# Patient Record
Sex: MALE | Race: WHITE | Employment: UNEMPLOYED | URBAN - METROPOLITAN AREA
[De-identification: names, ages, dates, MRNs, and addresses within clinical notes are randomized per-mention and may not be internally consistent; named-entity substitution may affect disease eponyms.]

---

## 2021-12-10 ENCOUNTER — TELEPHONE (OUTPATIENT)
Dept: SLEEP CENTER | Age: 66
End: 2021-12-10

## 2021-12-13 ENCOUNTER — TELEPHONE (OUTPATIENT)
Dept: SLEEP CENTER | Age: 66
End: 2021-12-13

## 2021-12-29 ENCOUNTER — TELEPHONE (OUTPATIENT)
Dept: SLEEP CENTER | Age: 66
End: 2021-12-29

## 2022-02-05 ENCOUNTER — TELEPHONE (OUTPATIENT)
Dept: SLEEP CENTER | Age: 67
End: 2022-02-05

## 2022-02-07 ENCOUNTER — HOSPITAL ENCOUNTER (OUTPATIENT)
Dept: SLEEP CENTER | Age: 67
Discharge: HOME | End: 2022-02-07
Payer: COMMERCIAL

## 2022-02-07 DIAGNOSIS — G47.33 OSA (OBSTRUCTIVE SLEEP APNEA): Primary | ICD-10-CM

## 2022-02-07 PROCEDURE — 95810 POLYSOM 6/> YRS 4/> PARAM: CPT

## 2022-02-21 LAB — STATUS: NORMAL

## 2022-02-21 PROCEDURE — 95810 POLYSOM 6/> YRS 4/> PARAM: CPT | Performed by: STUDENT IN AN ORGANIZED HEALTH CARE EDUCATION/TRAINING PROGRAM

## 2022-02-25 ENCOUNTER — TELEPHONE (OUTPATIENT)
Dept: SLEEP CENTER | Age: 67
End: 2022-02-25

## 2022-02-25 NOTE — TELEPHONE ENCOUNTER
Called patient to schedule CPAP, patient wishes to speak to his physician first before scheduling.   He will call us back

## 2022-08-15 ENCOUNTER — HOSPITAL ENCOUNTER (OUTPATIENT)
Dept: SLEEP CENTER | Age: 67
Discharge: HOME | End: 2022-08-15
Payer: COMMERCIAL

## 2022-08-15 DIAGNOSIS — G47.33 OSA (OBSTRUCTIVE SLEEP APNEA): Primary | ICD-10-CM

## 2022-08-15 PROCEDURE — 95811 POLYSOM 6/>YRS CPAP 4/> PARM: CPT

## 2022-08-19 PROCEDURE — 95811 POLYSOM 6/>YRS CPAP 4/> PARM: CPT | Performed by: INTERNAL MEDICINE

## 2022-08-19 NOTE — PROCEDURES
[address] [place] [address]       170 [facility], [address]        POLYSOMNOGRAPHY WITH CPAP TITRATION SLEEP STUDY REPORT       PATIENT NAME: Eduar Watts               : 1955        MED REC NO:  [id number]     ACCOUNT NO:   [de-identified]  PROVIDER:  Oscar Trejo DO  DATE OF STUDY: 8/15/22     SERVICE PROVIDER:  [facility]     REFERRING PROVIDER:   Dr. Esteban Aden   AGE: 77 yrs       SEX: M          HEIGHT: 6 ft   0 in         WEIGHT: 190 lbs          BMI: 25.8 kg/m2    NECK CIRCUMFERENCE: 15.75 in    Symptoms: Snoring, trouble falling asleep once awakened. The Kirksey Sleepiness Scale was 4 out of 24 (scores above or equal to 10 are suggestive of hypersomnolence). Indication: Titration of positive airway pressure therapy. Medical History:  Hypertension, hyperlipidemia, obstructive sleep apnea, central sleep apnea. Medications: Valsartan, amlodipine, rivaroxaban, alirocumab. DESCRIPTION: This patient has been previously diagnosed with sleep-disordered breathing and now returns for positive airway pressure titration. This full night of positive airway pressure titration consisted of EEG, EOG, EMG and 2-lead ECG monitoring. Airflow (internal CPAP/bi-level PAP flow signal), chest and abdominal efforts by respiratory inductance plethysmography or polyvinylidene fluoride (PVDF) sensor, and pulse oximetry were monitored as well. Hypopneas were scored as at least a 30% reduction in amplitude of the semi-quantitative flow signal, associated with a 3% or greater oxygen desaturation.  Respiratory effort related arousals (RERAs) were scored as at least a 30% reduction in amplitude of the semi-quantitative flow signal, associated with an EEG microarousal.      FINDINGS:  SLEEP CONTINUITY AND SLEEP ARCHITECTURE:    Testing began at 9:24:48 PM and ended at 5:21:27 AM, for a total recording time (TRT) of 476.7 minutes. The sleep period lasted 254.8 minutes and the total sleep time (TST) was 213.8 minutes, which resulted in a sleep efficiency (TST÷TRT) of 44.8%. The sleep latency (SL) was 221.9 minutes, and the latency to the first occurrence of Stage R was 166.0 minutes. There were 1 Stage R periods observed on this study night, 13 awakenings (i.e. transitions to Stage W from any sleep stage), and 62 total stage transitions. Wake after sleep onset (WASO) time accounted for 41.0 minutes, while the time spent is each sleep stage was 23.0 minutes (Stage N1); 137.5 minutes (Stage N2); 17.3 minutes (Stage N3); and 36.0 minutes (Stage R). The percentage of Total Sleep Time in each stage was: 10.8% (Stage N1); 64.3% (Stage N2); 8.1% (Stage N3); and 16.8% (Stage R). The microarousal index was increased at 27.2. RESPIRATORY MONITORING: CPAP was initiated at 5 cm of water pressure and titrated to 13 cm of water in order to abolish respiratory events. Respiratory events were significantly reduced with the use of positive airway pressure therapy. Titration at the optimal CPAP pressure of  13 cm of water resulted in an AHI of 1.1/hr, a minimum oxyhemoglobin saturation of 92%, and an average oxyhemoglobin saturation of 94.9%. Time spent on this level of CPAP included supine REM sleep. EEG: No abnormal epileptiform activity was observed. ECG: The electrocardiogram documented sinus rhythm. The average heart rate during sleep was 56 beats per minute. PERIODIC LIMB MOVEMENTS:  Few periodic limb movements were noted. EMG/VIDEO MONITORING:  Loss of REM atonia, bruxism, and parasomnias were not observed. IMPRESSION:    1. This study demonstrated significant improvement in sleep disordered breathing with CPAP of 13 cm H2O.  2.  Subjectively, the patient reported sleeping poorly but he is willing to use PAP therapy at home on a chronic basis. RECOMMENDATIONS:  1.   CPAP therapy at settings of 13 cm of water is recommended, to be ordered by referring physician. Equipment ordering information is below. Patient may be referred to [facility] for management of sleep apnea and PAP therapy, at discretion of referring provider. 2.  Per insurance requirements, the patient should be seen in clinical follow up within 3 months of receiving CPAP therapy in order to document adherence to therapy, assess efficacy of the prescribed settings (as based upon a residual AHI of less than or equal to 5 on the device's remote download), and evaluate resolution of sleep-related complaints. 3.  The patient should be strongly counseled against driving while drowsy.       EQUIPMENT ORDERING INFORMATION:  DEVICE: CPAP 13 with heated humidification and a remote modem    SETTINGS:  13 cm of water, EPR 3    MASK TYPE: Resmed F20, or per patient preference fitted per DME    MASK SIZE: Medium    SUPPLEMENTAL OXYGEN: None

## 2022-08-30 ENCOUNTER — TELEPHONE (OUTPATIENT)
Dept: SLEEP CENTER | Age: 67
End: 2022-08-30

## 2023-03-11 ENCOUNTER — APPOINTMENT (OUTPATIENT)
Dept: CT IMAGING | Age: 68
DRG: 069 | End: 2023-03-11
Payer: MEDICARE

## 2023-03-11 ENCOUNTER — APPOINTMENT (OUTPATIENT)
Dept: MRI IMAGING | Age: 68
DRG: 069 | End: 2023-03-11
Payer: MEDICARE

## 2023-03-11 ENCOUNTER — HOSPITAL ENCOUNTER (INPATIENT)
Age: 68
LOS: 1 days | Discharge: HOME | DRG: 069 | End: 2023-03-12
Attending: EMERGENCY MEDICINE | Admitting: FAMILY MEDICINE
Payer: MEDICARE

## 2023-03-11 DIAGNOSIS — R29.90 STROKE-LIKE SYMPTOMS: Primary | ICD-10-CM

## 2023-03-11 LAB
ALBUMIN SERPL-MCNC: 4.4 G/DL (ref 3.5–5.2)
ALP BLD-CCNC: 66 U/L (ref 40–129)
ALT SERPL-CCNC: 25 U/L (ref 0–40)
ANION GAP SERPL CALCULATED.3IONS-SCNC: 13 MMOL/L (ref 7–16)
AST SERPL-CCNC: 28 U/L (ref 0–39)
BASOPHILS ABSOLUTE: 0.07 E9/L (ref 0–0.2)
BASOPHILS RELATIVE PERCENT: 1 % (ref 0–2)
BILIRUB SERPL-MCNC: 0.4 MG/DL (ref 0–1.2)
BUN BLDV-MCNC: 18 MG/DL (ref 6–23)
CALCIUM SERPL-MCNC: 11 MG/DL (ref 8.6–10.2)
CHLORIDE BLD-SCNC: 101 MMOL/L (ref 98–107)
CO2: 26 MMOL/L (ref 22–29)
CREAT SERPL-MCNC: 1 MG/DL (ref 0.7–1.2)
EOSINOPHILS ABSOLUTE: 0.37 E9/L (ref 0.05–0.5)
EOSINOPHILS RELATIVE PERCENT: 5.3 % (ref 0–6)
GFR SERPL CREATININE-BSD FRML MDRD: >60 ML/MIN/1.73
GLUCOSE BLD-MCNC: 120 MG/DL (ref 74–99)
HCT VFR BLD CALC: 42.6 % (ref 37–54)
HEMOGLOBIN: 14.6 G/DL (ref 12.5–16.5)
IMMATURE GRANULOCYTES #: 0.02 E9/L
IMMATURE GRANULOCYTES %: 0.3 % (ref 0–5)
LYMPHOCYTES ABSOLUTE: 1.97 E9/L (ref 1.5–4)
LYMPHOCYTES RELATIVE PERCENT: 28.3 % (ref 20–42)
MCH RBC QN AUTO: 30.9 PG (ref 26–35)
MCHC RBC AUTO-ENTMCNC: 34.3 % (ref 32–34.5)
MCV RBC AUTO: 90.1 FL (ref 80–99.9)
MONOCYTES ABSOLUTE: 0.99 E9/L (ref 0.1–0.95)
MONOCYTES RELATIVE PERCENT: 14.2 % (ref 2–12)
NEUTROPHILS ABSOLUTE: 3.53 E9/L (ref 1.8–7.3)
NEUTROPHILS RELATIVE PERCENT: 50.9 % (ref 43–80)
PDW BLD-RTO: 12.8 FL (ref 11.5–15)
PLATELET # BLD: 248 E9/L (ref 130–450)
PMV BLD AUTO: 9 FL (ref 7–12)
POTASSIUM SERPL-SCNC: 3.8 MMOL/L (ref 3.5–5)
RBC # BLD: 4.73 E12/L (ref 3.8–5.8)
SARS-COV-2, NAAT: NOT DETECTED
SODIUM BLD-SCNC: 140 MMOL/L (ref 132–146)
TOTAL PROTEIN: 7.5 G/DL (ref 6.4–8.3)
TROPONIN, HIGH SENSITIVITY: 8 NG/L (ref 0–11)
WBC # BLD: 7 E9/L (ref 4.5–11.5)

## 2023-03-11 PROCEDURE — G0378 HOSPITAL OBSERVATION PER HR: HCPCS

## 2023-03-11 PROCEDURE — 93005 ELECTROCARDIOGRAM TRACING: CPT | Performed by: EMERGENCY MEDICINE

## 2023-03-11 PROCEDURE — 99222 1ST HOSP IP/OBS MODERATE 55: CPT | Performed by: PSYCHIATRY & NEUROLOGY

## 2023-03-11 PROCEDURE — 70551 MRI BRAIN STEM W/O DYE: CPT

## 2023-03-11 PROCEDURE — 92523 SPEECH SOUND LANG COMPREHEN: CPT | Performed by: SPEECH-LANGUAGE PATHOLOGIST

## 2023-03-11 PROCEDURE — 80053 COMPREHEN METABOLIC PANEL: CPT

## 2023-03-11 PROCEDURE — 85025 COMPLETE CBC W/AUTO DIFF WBC: CPT

## 2023-03-11 PROCEDURE — 6370000000 HC RX 637 (ALT 250 FOR IP): Performed by: FAMILY MEDICINE

## 2023-03-11 PROCEDURE — 2060000000 HC ICU INTERMEDIATE R&B

## 2023-03-11 PROCEDURE — 84484 ASSAY OF TROPONIN QUANT: CPT

## 2023-03-11 PROCEDURE — 70450 CT HEAD/BRAIN W/O DYE: CPT

## 2023-03-11 PROCEDURE — 6370000000 HC RX 637 (ALT 250 FOR IP): Performed by: STUDENT IN AN ORGANIZED HEALTH CARE EDUCATION/TRAINING PROGRAM

## 2023-03-11 PROCEDURE — 87635 SARS-COV-2 COVID-19 AMP PRB: CPT

## 2023-03-11 PROCEDURE — 99285 EMERGENCY DEPT VISIT HI MDM: CPT

## 2023-03-11 PROCEDURE — 92610 EVALUATE SWALLOWING FUNCTION: CPT | Performed by: SPEECH-LANGUAGE PATHOLOGIST

## 2023-03-11 RX ORDER — ASPIRIN 300 MG/1
300 SUPPOSITORY RECTAL DAILY
Status: DISCONTINUED | OUTPATIENT
Start: 2023-03-11 | End: 2023-03-12 | Stop reason: HOSPADM

## 2023-03-11 RX ORDER — POLYETHYLENE GLYCOL 3350 17 G/17G
17 POWDER, FOR SOLUTION ORAL DAILY PRN
Status: DISCONTINUED | OUTPATIENT
Start: 2023-03-11 | End: 2023-03-12 | Stop reason: HOSPADM

## 2023-03-11 RX ORDER — ESCITALOPRAM OXALATE 10 MG/1
10 TABLET ORAL DAILY
COMMUNITY

## 2023-03-11 RX ORDER — HYDROCHLOROTHIAZIDE 25 MG/1
12.5 TABLET ORAL DAILY
Status: DISCONTINUED | OUTPATIENT
Start: 2023-03-11 | End: 2023-03-12 | Stop reason: HOSPADM

## 2023-03-11 RX ORDER — ESCITALOPRAM OXALATE 10 MG/1
10 TABLET ORAL DAILY
Status: DISCONTINUED | OUTPATIENT
Start: 2023-03-11 | End: 2023-03-12 | Stop reason: HOSPADM

## 2023-03-11 RX ORDER — ALIROCUMAB 75 MG/ML
75 INJECTION, SOLUTION SUBCUTANEOUS
COMMUNITY

## 2023-03-11 RX ORDER — ENOXAPARIN SODIUM 100 MG/ML
40 INJECTION SUBCUTANEOUS DAILY
Status: DISCONTINUED | OUTPATIENT
Start: 2023-03-11 | End: 2023-03-11

## 2023-03-11 RX ORDER — AMLODIPINE BESYLATE 10 MG/1
10 TABLET ORAL DAILY
COMMUNITY

## 2023-03-11 RX ORDER — VALSARTAN 320 MG/1
320 TABLET ORAL DAILY
Status: DISCONTINUED | OUTPATIENT
Start: 2023-03-11 | End: 2023-03-12 | Stop reason: HOSPADM

## 2023-03-11 RX ORDER — HYDROCHLOROTHIAZIDE 12.5 MG/1
12.5 TABLET ORAL DAILY
COMMUNITY

## 2023-03-11 RX ORDER — LANOLIN ALCOHOL/MO/W.PET/CERES
3 CREAM (GRAM) TOPICAL ONCE
Status: COMPLETED | OUTPATIENT
Start: 2023-03-11 | End: 2023-03-12

## 2023-03-11 RX ORDER — ONDANSETRON 2 MG/ML
4 INJECTION INTRAMUSCULAR; INTRAVENOUS EVERY 6 HOURS PRN
Status: DISCONTINUED | OUTPATIENT
Start: 2023-03-11 | End: 2023-03-12 | Stop reason: HOSPADM

## 2023-03-11 RX ORDER — ATORVASTATIN CALCIUM 40 MG/1
80 TABLET, FILM COATED ORAL NIGHTLY
Status: DISCONTINUED | OUTPATIENT
Start: 2023-03-11 | End: 2023-03-12

## 2023-03-11 RX ORDER — VALSARTAN 320 MG/1
320 TABLET ORAL DAILY
COMMUNITY

## 2023-03-11 RX ORDER — AMLODIPINE BESYLATE 5 MG/1
5 TABLET ORAL DAILY
Status: DISCONTINUED | OUTPATIENT
Start: 2023-03-11 | End: 2023-03-12 | Stop reason: HOSPADM

## 2023-03-11 RX ORDER — ONDANSETRON 4 MG/1
4 TABLET, ORALLY DISINTEGRATING ORAL EVERY 8 HOURS PRN
Status: DISCONTINUED | OUTPATIENT
Start: 2023-03-11 | End: 2023-03-12 | Stop reason: HOSPADM

## 2023-03-11 RX ORDER — ASPIRIN 81 MG/1
81 TABLET ORAL DAILY
Status: DISCONTINUED | OUTPATIENT
Start: 2023-03-11 | End: 2023-03-12 | Stop reason: HOSPADM

## 2023-03-11 RX ADMIN — HYDROCHLOROTHIAZIDE 12.5 MG: 12.5 TABLET ORAL at 14:06

## 2023-03-11 RX ADMIN — AMLODIPINE BESYLATE 5 MG: 5 TABLET ORAL at 10:06

## 2023-03-11 RX ADMIN — ASPIRIN 81 MG: 81 TABLET, COATED ORAL at 10:07

## 2023-03-11 RX ADMIN — ESCITALOPRAM OXALATE 10 MG: 10 TABLET ORAL at 14:06

## 2023-03-11 NOTE — PROGRESS NOTES
Hospitalist Progress Note      Synopsis: Patient admitted on 3/11/2023. Had concerns including Numbness (Pt to ED stating earlier he was playing golf and his vision went blurry pt also stating he felt like he was having a hard time getting words out on the phone. Pt states he woke up today and had numbness in R-face and R-leg. Hx blood clots in R-leg. +xarelto. Pt arrives A&Ox4, ambulatory, speech clear, no facial droop, equal strength in BUE and BLE, sensation intact. Recent flight to [facility]). Subjective  Seen and examined at bedside today morning. Patient does not exhibit any neurological deficits. Patient is resting comfortably. Clinically improving. Feeling better. Stable overnight. No other overnight issues reported. No CP, SOB, palpitations, blurred vision, HA, lightheadedness, LOC or focal neurological deficits    Exam:  BP (!) 136/97   Pulse 61   Temp 98.3 °F (36.8 °C) (Oral)   Resp 16   Ht 6' (1.829 m)   Wt 190 lb (86.2 kg)   SpO2 99%   BMI 25.77 kg/m²   General appearance: No apparent distress, appears stated age and cooperative. HEENT: Pupils equal, round, and reactive to light. Conjunctivae/corneas clear. Neck: Supple. No jugular venous distention. Trachea midline. Respiratory:  Normal respiratory effort. Clear to auscultation, bilaterally without Rales/Wheezes/Rhonchi. Cardiovascular: Regular rate and rhythm with normal S1/S2 without murmurs, rubs or gallops. Abdomen: Soft, non-tender, non-distended with normal bowel sounds. Musculoskeletal: No clubbing, cyanosis or edema bilaterally. Brisk capillary refill. 2+ lower extremity pulses (dorsalis pedis).    Skin:  No rashes    Neurologic: awake, alert and following commands     Medications:  Reviewed    Infusion Medications   Scheduled Medications    amLODIPine  5 mg Oral Daily    valsartan  320 mg Oral Daily    enoxaparin  40 mg SubCUTAneous Daily    aspirin  81 mg Oral Daily    Or    aspirin  300 mg Rectal Daily atorvastatin  80 mg Oral Nightly     PRN Meds: ondansetron **OR** ondansetron, polyethylene glycol    I/O  No intake or output data in the 24 hours ending 03/11/23 1009    Labs:   Recent Labs     03/11/23 0316   WBC 7.0   HGB 14.6   HCT 42.6          Recent Labs     03/11/23 0316      K 3.8      CO2 26   BUN 18   CREATININE 1.0   CALCIUM 11.0*       Recent Labs     03/11/23 0316   PROT 7.5   ALKPHOS 66   ALT 25   AST 28   BILITOT 0.4       No results for input(s): INR in the last 72 hours. No results for input(s): Rosita Zoilaels in the last 72 hours. Chronic labs:  No results found for: CHOL, TRIG, HDL, LDLCALC, TSH, PSA, INR, LABA1C    Radiology:  Imaging studies reviewed today. ASSESSMENT:  Suspected stroke versus TIA  Hypertension  Hyperlipidemia  History of previous DVT and PE       PLAN:  Patient currently neurologically intact. CT head negative. MRI does not show any acute ischemia, only mild chronic microvascular disease within the periventricular and subcortical white matter. We will continue with neurochecks and place patient in telemetry. Continue with aspirin, atorvastatin, Lovenox. A1c and lipid panel pending. Continue with amlodipine, hydrochlorothiazide and valsartan. Neurology consult placed. Patient had DVT in January of last year, hence he has been taking Xarelto. We will continue. Patient also states that he was on a recent flight from [place] this past Wednesday. Diet: Diet NPO  Code Status: Full Code  PT/OT Eval Status:   None need at the moment   DVT Prophylaxis:   Xarelto  Recommended disposition at discharge:  Home    +++++++++++++++++++++++++++++++++++++++++++++++++  Bridget Macdonald MD   [facility].  +++++++++++++++++++++++++++++++++++++++++++++++++  NOTE: This report was transcribed using voice recognition software.  Every effort was made to ensure accuracy; however, inadvertent computerized transcription errors may be present.

## 2023-03-11 NOTE — FLOWSHEET NOTE
Patient to ED after waking up and experiencing numbness on the right side of his face. Patient explains that this is the third incident within 24 hours. He also had some extremity weakness.

## 2023-03-11 NOTE — ED PROVIDER NOTES
HPI:  3/11/23, Time: 3:11 AM JANEL Payton is a 79 y.o. male presenting to the ED for history of numbness to right side of face as well as right lower extremity  beginning 1 hour ago. The complaint has been persistent, moderate in severity, and worsened by nothing. Patient reporting around 3 PM he had an episode where he had some slurred speech and difficulty expressing self. Patient reporting he was also golfing at the time around 4 PM and was having episode of dizziness he reports no loss of vision or any blurred vision. Patient reporting no chest pain no difficulty breathing he does report some mild cough he reports he is getting over a cold. Patient is on Xarelto for history of DVT as well as PE. Patient reporting no pleuritic pain. He reports no upper or lower extremity weakness he reports no headache. Patient reporting no neck or back pain. Patient reporting no symptoms like this in the past.     ROS:   Pertinent positives and negatives are stated within HPI, all other systems reviewed and are negative.  --------------------------------------------- PAST HISTORY ---------------------------------------------  Past Medical History:  has a past medical history of Hyperlipidemia and Hypertension. Past Surgical History:  has a past surgical history that includes transesophageal echocardiogram (03/20/2014); knee surgery; and Nose surgery. Social History:  reports that he has never smoked. He does not have any smokeless tobacco history on file. He reports that he does not drink alcohol and does not use drugs. Family History: family history is not on file. The patients home medications have been reviewed. Allergies: Patient has no known allergies.     ---------------------------------------------------PHYSICAL EXAM--------------------------------------    Constitutional/General: Alert and oriented x3, well appearing, non toxic in NAD  Head: Normocephalic and atraumatic  Eyes: PERRL, EOMI  Mouth: Oropharynx clear, handling secretions, no trismus  Neck: Supple, full ROM, non tender to palpation in the midline, no stridor, no crepitus, no meningeal signs  Pulmonary: Lungs clear to auscultation bilaterally, no wheezes, rales, or rhonchi. Not in respiratory distress  Cardiovascular:  Regular rate. Regular rhythm. No murmurs, gallops, or rubs. 2+ distal pulses  Chest: no chest wall tenderness  Abdomen: Soft. Non tender. Non distended. +BS. No rebound, guarding, or rigidity. No pulsatile masses appreciated. Musculoskeletal: Moves all extremities x 4. Warm and well perfused, no clubbing, cyanosis, or edema. Capillary refill <3 seconds  Skin: warm and dry. No rashes. Neurologic: GCS 15, CN 2-12 grossly intact, no focal deficits, symmetric strength 5/5 in the upper and lower extremities bilaterally  Psych: Normal Affect  NIH Stroke Scale at time of initial evaluation:  1A: Level of Consciousness 0 - alert; keenly responsive   1B: Ask Month and Age 0 - answers both questions correctly   1C:  Tell Patient To Open and Close Eyes, then Hand  Squeeze 0 - performs both tasks correctly   2: Test Horizontal Extraocular Movements 0 - normal   3: Test Visual Fields 0 - no visual loss   4: Test Facial Palsy 0 - normal symmetric movement   5A: Test Left Arm Motor Drift 0 - no drift, limb holds 90 (or 45) degrees for full 10 seconds   5B: Test Right Arm Motor Drift 0 - no drift, limb holds 90 (or 45) degrees for full 10 seconds   6A: Test Left Leg Motor Drift 0 - no drift; leg holds 30 degree position for full 5 seconds   6B: Test Right Leg Motor Drift 0 - no drift; leg holds 30 degree position for full 5 seconds   7: Test Limb Ataxia   (FNF/Heel-Shin) 0 - absent   8: Test Sensation 0 - normal; no sensory loss   9: Test Language/Aphasia 0 - no aphasia, normal   10: Test Dysarthria 0 - normal   11: Test Extinction/Inattention 0 - no abnormality   Total 0 -------------------------------------------------- RESULTS -------------------------------------------------  I have personally reviewed all laboratory and imaging results for this patient. Results are listed below.      LABS:  Results for orders placed or performed during the hospital encounter of 03/11/23   CBC with Auto Differential   Result Value Ref Range    WBC 7.0 4.5 - 11.5 E9/L    RBC 4.73 3.80 - 5.80 E12/L    Hemoglobin 14.6 12.5 - 16.5 g/dL    Hematocrit 42.6 37.0 - 54.0 %    MCV 90.1 80.0 - 99.9 fL    MCH 30.9 26.0 - 35.0 pg    MCHC 34.3 32.0 - 34.5 %    RDW 12.8 11.5 - 15.0 fL    Platelets 663 680 - 733 E9/L    MPV 9.0 7.0 - 12.0 fL    Neutrophils % 50.9 43.0 - 80.0 %    Immature Granulocytes % 0.3 0.0 - 5.0 %    Lymphocytes % 28.3 20.0 - 42.0 %    Monocytes % 14.2 (H) 2.0 - 12.0 %    Eosinophils % 5.3 0.0 - 6.0 %    Basophils % 1.0 0.0 - 2.0 %    Neutrophils Absolute 3.53 1.80 - 7.30 E9/L    Immature Granulocytes # 0.02 E9/L    Lymphocytes Absolute 1.97 1.50 - 4.00 E9/L    Monocytes Absolute 0.99 (H) 0.10 - 0.95 E9/L    Eosinophils Absolute 0.37 0.05 - 0.50 E9/L    Basophils Absolute 0.07 0.00 - 0.20 E9/L   Comprehensive Metabolic Panel   Result Value Ref Range    Sodium 140 132 - 146 mmol/L    Potassium 3.8 3.5 - 5.0 mmol/L    Chloride 101 98 - 107 mmol/L    CO2 26 22 - 29 mmol/L    Anion Gap 13 7 - 16 mmol/L    Glucose 120 (H) 74 - 99 mg/dL    BUN 18 6 - 23 mg/dL    Creatinine 1.0 0.7 - 1.2 mg/dL    Est, Glom Filt Rate >60 >=60 mL/min/1.73    Calcium 11.0 (H) 8.6 - 10.2 mg/dL    Total Protein 7.5 6.4 - 8.3 g/dL    Albumin 4.4 3.5 - 5.2 g/dL    Total Bilirubin 0.4 0.0 - 1.2 mg/dL    Alkaline Phosphatase 66 40 - 129 U/L    ALT 25 0 - 40 U/L    AST 28 0 - 39 U/L   Troponin   Result Value Ref Range    Troponin, High Sensitivity 8 0 - 11 ng/L   EKG 12 Lead   Result Value Ref Range    Ventricular Rate 67 BPM    Atrial Rate 67 BPM    P-R Interval 198 ms    QRS Duration 90 ms    Q-T Interval 412 ms QTc Calculation (Bazett) 435 ms    P Axis 27 degrees    R Axis -12 degrees    T Axis 8 degrees       RADIOLOGY:  Interpreted by Radiologist.  CT HEAD WO CONTRAST   Final Result   No acute intracranial abnormality. RECOMMENDATIONS:   Careful clinical correlation and follow up recommended. EKG:  This EKG is signed and interpreted by me. Rate: 67  Rhythm: Sinus  Interpretation: no acute changes  Comparison: stable as compared to patient's most recent EKG 11/25/15      ------------------------- NURSING NOTES AND VITALS REVIEWED ---------------------------   The nursing notes within the ED encounter and vital signs as below have been reviewed by myself. BP (!) 145/94   Pulse 64   Temp 98.3 °F (36.8 °C) (Oral)   Resp 16   SpO2 96%   Oxygen Saturation Interpretation: Normal    The patients available past medical records and past encounters were reviewed. ------------------------------ ED COURSE/MEDICAL DECISION MAKING----------------------  Medications - No data to display          Medical Decision Making:      History From:   Patient presenting here because of numbness right side of face as well as extremity. Patient reports it started an hour ago but prior to that in the afternoon he was having some slurred speech and having trouble expressing himself. Patient reporting some dizziness as well he reports no chest pain or difficulty breathing reports no abdominal pain. Patient is on Xarelto for history of DVT as well as PE. Patient reporting no headache he reports no neck or back pain    CC/HPI Summary, DDx, ED Course, Reassessment, Tests Considered, Patient expectation:     With history of hypertension history of DVT as well as PE as well as history of hyperlipidemia presenting here because of right-sided facial numbness as right lower extremity numbness.   Patient reporting no chest pain or difficulty breathing reports no active slurred speech but reports in the afternoon he was having some slurred speech as well as dizziness. He reports no loss of vision he reports no headache. Patient reporting no visual disturbance. Patient systolic blood pressure was 137/103 patient on arrival is awake alert oriented x3 heart lung exam normal abdomen soft nontender. Patient has normal strength in all extremities his gait is steady and normal.  Pulses are intact distally. Patient stroke scale is a 0. Patient differential includes TIA as well as stroke as well as migraine headache as well as electrolyte imbalance  Patient had IV established he was placed on monitor. Vital signs stable. Patient CBC and electrolytes within normal limits troponin within the limits. His white count was 7 potassium was 3.8 troponin was 8. Patient did undergo CT of the head I did not appreciate any intracranial findings as far as any signs of stroke or mass or intracranial hemorrhage. We did place a call to internal medicine. Patient was made aware of findings and plan. Patient will be admitted for further evaluation treatment. Social Determinants affecting Dx or Tx: Patient does not smoke he does drink socially. Chronic Conditions: History of DVT history of obstructive sleep apnea    Records Reviewed: Old records reviewed. Patient was seen in for an outpatient was diagnosed with DVT on Xarelto. Patient was diagnosed on January 20, 2022 patient was seen here in December 2013 for paresthesias. Patient did have a CT of his head in December 2013 which was negative        Re-Evaluations:             Re-evaluation. Patients symptoms show no change  Patient reevaluated unchanged. Patient was made aware of findings and plan. Consultations:               Internal medicine  Critical Care: This patient's ED course included: a personal history and physicial eaxmination    This patient has been closely monitored during their ED course. Counseling:    The emergency provider has spoken with the patient and discussed todays results, in addition to providing specific details for the plan of care and counseling regarding the diagnosis and prognosis. Questions are answered at this time and they are agreeable with the plan.       --------------------------------- IMPRESSION AND DISPOSITION ---------------------------------    IMPRESSION  1. Stroke-like symptoms        DISPOSITION  Disposition: Admit to telemetry  Patient condition is stable        NOTE: This report was transcribed using voice recognition software.  Every effort was made to ensure accuracy; however, inadvertent computerized transcription errors may be present          Luis Newman MD  03/11/23 92 Srikanth Gerardo MD  03/11/23 92 Srikanth Gerardo MD  03/11/23 [id number]

## 2023-03-11 NOTE — H&P
Hospitalist History & Physical      PCP: Cynthia Lei MD    Date of Service: Pt seen/examined on 3/11/2023     Chief Complaint:  had concerns including Numbness (Pt to ED stating earlier he was playing golf and his vision went blurry pt also stating he felt like he was having a hard time getting words out on the phone. Pt states he woke up today and had numbness in R-face and R-leg. Hx blood clots in R-leg. +xarelto. Pt arrives A&Ox4, ambulatory, speech clear, no facial droop, equal strength in BUE and BLE, sensation intact. Recent flight to [place]).    History Of Present Illness:    Mr. Trae Whitfield, a 67 y.o. year old male  who  has a past medical history of Hyperlipidemia and Hypertension.     Patient presented to the emergency department with numbness of the right side of his face as well as right lower extremity.  Began about an hour prior to arrival.  Patient reports around 3:00 this afternoon he had an episode with some slurred speech and difficulty expressing himself.  He was golfing at that time was having episode of dizziness.  Patient denies fever, chills, nausea, vomiting, chest pain, shortness of breath.  Vital signs are within normal limits and stable.  The patient is afebrile.  Laboratory studies were unremarkable.  CT head was unremarkable.  Patient will be admitted to medicine for further neurological evaluation.      Past Medical History:   Diagnosis Date    Hyperlipidemia     Hypertension        Past Surgical History:   Procedure Laterality Date    KNEE SURGERY      NOSE SURGERY      TRANSESOPHAGEAL ECHOCARDIOGRAM  03/20/2014    ROSHAN Gilbert       Prior to Admission medications    Medication Sig Start Date End Date Taking? Authorizing Provider   atorvastatin (LIPITOR) 20 MG tablet Take 1 tablet by mouth daily 11/25/15   Giovanny Randhawa DO   valsartan (DIOVAN) 160 MG tablet Take 1 tablet by mouth daily 11/25/15   Giovanny Randhawa, DO   amLODIPine (NORVASC) 5 MG tablet Take 1 tablet by  mouth daily 11/25/15   Arieeliz SotomayorDO   zolpidem (AMBIEN) 10 MG tablet  9/23/15   Historical Provider, MD   LORazepam (ATIVAN) 1 MG tablet every 6 hours as needed  9/24/15   Historical Provider, MD   Omega-3 Fatty Acids (FISH OIL PO) Take by mouth    Historical Provider, MD   Multiple Vitamins-Minerals (THERAPEUTIC MULTIVITAMIN-MINERALS) tablet Take 1 tablet by mouth daily    Historical Provider, MD   aspirin 81 MG tablet Take 81 mg by mouth daily    Historical Provider, MD         Allergies:  Patient has no known allergies. Social History:    TOBACCO:   reports that he has never smoked. He does not have any smokeless tobacco history on file. ETOH:   reports no history of alcohol use. Family History:    Reviewed in detail and negative for DM, CAD, Cancer, CVA. Positive as follows\"  History reviewed. No pertinent family history. REVIEW OF SYSTEMS:   Pertinent positives as noted in the HPI. All other systems reviewed and negative. PHYSICAL EXAM:  BP (!) 137/103   Pulse 72   Temp 98.3 °F (36.8 °C) (Oral)   Resp 18   SpO2 97%   General appearance: No apparent distress, appears stated age and cooperative. HEENT: Normal cephalic, atraumatic without obvious deformity. Pupils equal, round, and reactive to light. Extra ocular muscles intact. Conjunctivae/corneas clear. Neck: Supple, with full range of motion. No jugular venous distention. Trachea midline. Respiratory: CTA  Cardiovascular: RRR  Abdomen: S/NT/ND  Musculoskeletal: No clubbing, cyanosis, edema of bilateral lower extremities. Brisk capillary refill. Skin: Normal skin color. No rashes or lesions. Neurologic:  Neurovascularly intact without any focal sensory/motor deficits.  Cranial nerves: II-XII intact, grossly non-focal.  Psychiatric: Alert and oriented, thought content appropriate, normal insight    Reviewed EKG and CXR personally      CBC:   Recent Labs     03/11/23  0316   WBC 7.0   RBC 4.73   HGB 14.6   HCT 42.6   MCV 90.1   RDW 12.8        BMP:   Recent Labs     03/11/23 0316      K 3.8      CO2 26   BUN 18   CREATININE 1.0     LFT:  Recent Labs     03/11/23 0316   PROT 7.5   ALKPHOS 66   ALT 25   AST 28   BILITOT 0.4     CE:  No results for input(s): Eduardo Padilla in the last 72 hours. PT/INR: No results for input(s): INR, APTT in the last 72 hours. BNP: No results for input(s): BNP in the last 72 hours. ESR: No results found for: SEDRATE  CRP: No results found for: CRP  D Dimer: No results found for: DDIMER   Folate and B12: No results found for: IVWOIYKE10, No results found for: FOLATE  Lactic Acid: No results found for: LACTA  Thyroid Studies: No results found for: TSH, H9KUFTW, X4PMSFA, THYROIDAB    Oupatient labs:  No results found for: CHOL, TRIG, HDL, LDLCALC, TSH, PSA, INR, LABA1C    Urinalysis:  No results found for: NITRU, WBCUA, BACTERIA, RBCUA, BLOODU, SPECGRAV, GLUCOSEU    Imaging:  No results found.     ASSESSMENT:  -Cerebrovascular accident  -Strokelike symptoms  -Hypertension  -Hyperlipidemia      PLAN:  -Admit to medicine  -Consult neurology  -MRI of the brain without contrast  -Serial neurological checks  -Telemetry  -Check A1c and lipid panel  -Aspirin, atorvastatin, Lovenox  -Continue home medications        Diet: No diet orders on file  Code Status: Prior  Surrogate decision maker confirmed with patient:   Extended Emergency Contact Information  Primary Emergency Contact: Beau Wang   Address: [facility]           Ary Orelijah [address] [place] [address] Phone: [phone]  Relation: Spouse  Secondary Emergency Contact: Rubi Salmon  Address: [place], [address] Phone: [phone]  Relation: Other    DVT Prophylaxis: []Lovenox []Heparin []PCD [] [address] []Encouraged ambulation  Disposition: []Med/Surg [] Intermediate [] ICU/CCU  Admit status: [] Observation [] Inpatient +++++++++++++++++++++++++++++++++++++++++++++++++  Baez Class, DO  +++++++++++++++++++++++++++++++++++++++++++++++++  NOTE: This report was transcribed using voice recognition software. Every effort was made to ensure accuracy; however, inadvertent computerized transcription errors may be present.

## 2023-03-11 NOTE — PROGRESS NOTES
SPEECH/LANGUAGE PATHOLOGY  SPEECH/LANGUAGE/COGNITIVE EVALUATION   and PLAN OF CARE      PATIENT NAME:  Say Ngo  (male)     MRN:  [id number]    :  1955  (79 y.o.)  STATUS:  Inpatient: Room     TODAY'S DATE:  3/11/2023  REFERRING PROVIDER:   23    Speech Language Pathology (SLP) eval and treat  Start:  23,   End:  23,   ONE TIME,   Standing Count:  1 Occurrences,   R         Sheriekirstycr Sicks, DO   REASON FOR REFERRAL:  stroke like symptoms  EVALUATING THERAPIST: HAMLET Alicea    ADMITTING DIAGNOSIS: Stroke-like symptoms [R29.90]    VISIT DIAGNOSIS:        SPEECH THERAPY  PLAN OF CARE   The speech therapy  POC is established based on physician order, speech pathology diagnosis and results of clinical assessment     SPEECH PATHOLOGY DIAGNOSIS:    Speech/Language/Cognition WNL at time of evaluation    Speech Pathology intervention is not warranted at this time. Conditions Requiring Skilled Therapeutic Intervention for speech, language and/or cognition    Not applicable     Specific Speech Therapy Interventions to Include:   Not applicable    Specific instructions for next treatment:     not applicable    SHORT/LONG TERM GOALS  Not applicable. Therapy is not recommended    Patient goals: Patient/family involved in developing goals and treatment plan:   Treatment goals discussed with Patient    The Patient understand(s) the diagnosis, prognosis and plan of care   The patient/family Agreed with above,     This plan may be re-evaluated and revised as warranted. Rehabilitation Potential/Prognosis: excellent                CLINICAL ASSESSMENT:  MOTOR SPEECH       Oral Peripheral Examination    possible mild left labiofacial weakness    Parameters of Speech Production  Respiration:  Adequate for speech production  Articulation:  Within functional limits  Resonance:  Within functional limits  Quality:   Within functional limits  Pitch:     Within functional limits  Intensity: Within functional limits  Fluency:  Intact  Prosody Intact    RECEPTIVE LANGUAGE    Comprehension of Yes/No Questions: Within functional limits    Process  Simple Verbal Commands:   Within functional limits  Process Intermediate Verbal Commands:   Within functional limits  Process Complex Verbal Commands:     Within functional limits    Comprehension of Conversation:      Within functional limits      EXPRESSIVE LANGUAGE     Serials: Functional    Imitation:  Words   Functional   Sentences Functional    Naming:  (Modality used:  Verbal)  Confrontation Naming  Functional  Functional Description  Functional  Response Naming: Functional    Conversation:      Conversation was within functional limits    COGNITION     Attention/Orientation  Attention: Sustained attention   Orientation:  Oriented to Person, Place, Date, Reason for hospitalization    Memory   Immediate Recall: Repeated 3/3    Delayed Recall:   Recalled 2/3    Long Term Recall:   Recalled Address, Birthdate, and Age    Organization/Problem Solving/Reasoning   Verbal Sequencing:   Functional        Verbal Problem solving:   Functional          CLINICAL OBSERVATIONS NOTED DURING THE EVALUATION  Within functional limits                  EDUCATION:   The Speech Language Pathologist (SLP) completed education regarding results of evaluation and that intervention is not warranted at this time. Learner: Patient  Education: Reviewed results and recommendations of this evaluation  Evaluation of Education:  Verbalizes understanding    Evaluation Time includes thorough review of current medical information, gathering information on past medical history/social history and prior level of function, completion of standardized testing/informal observation of tasks, assessment of data and education on plan of care and goals.       CPT code:    32731  eval speech sound lang comprehension      The admitting diagnosis and active problem list, as listed below have been reviewed prior to initiation of this evaluation. ACTIVE PROBLEM LIST:   Patient Active Problem List   Diagnosis    Hypertension    Hyperlipidemia    DEMARCUS (obstructive sleep apnea)    Mixed sleep apnea    Stroke-like symptoms       Louie GLASGOW CCC/SLP [id number]  Speech-Language Pathologist

## 2023-03-11 NOTE — PROGRESS NOTES
SPEECH/LANGUAGE PATHOLOGY  CLINICAL ASSESSMENT OF SWALLOWING FUNCTION   and PLAN OF CARE    PATIENT NAME:  Sirena Carey  (male)     MRN:  [id number]    :  1955  (79 y.o.)  STATUS:  Inpatient: Room     TODAY'S DATE:  3/11/2023  REFERRING PROVIDER:   23    Speech Language Pathology (SLP) eval and treat  Start:  23,   End:  23,   ONE TIME,   Standing Count:  1 Occurrences,   R         Argeliazana Osorio, DO   REASON FOR REFERRAL: stroke like symptoms   EVALUATING THERAPIST: Sara Severino, SLP                 RESULTS:    DYSPHAGIA DIAGNOSIS:   Clinical indicators of functional swallow for age/premorbid functioning      DIET RECOMMENDATIONS:  Regular consistency solids (IDDSI level 7) with  thin liquids (IDDSI level 0)     FEEDING RECOMMENDATIONS:     Assistance level:  No assistance needed      Compensatory strategies recommended: No strategies are recommended at this time      Discussed recommendations with nursing and/or faxed report to referring provider: Yes    SPEECH THERAPY  PLAN OF CARE   The dysphagia POC is established based on physician order, dysphagia diagnosis and results of clinical assessment     Dysphagia therapy is not recommended     Conditions Requiring Skilled Therapeutic Intervention for dysphagia:    Not applicable    Specific dysphagia interventions to include:     Not applicable no therapy warranted     Specific instructions for next treatment:  not applicable   Patient Treatment Goals:    Short Term Goals:  Not applicable no therapy warranted     Long Term Goals:   Not applicable no therapy warranted      Patient/family Goal:    not applicable    Plan of care discussed with Patient   The Patient understand(s) the diagnosis, prognosis and plan of care     Rehabilitation Potential/Prognosis: excellent                    ADMITTING DIAGNOSIS: Stroke-like symptoms [R29.90]    VISIT DIAGNOSIS:      PATIENT REPORT/COMPLAINT: patient currently NPO pending results of this evaluation  RN cleared patient for participation in assessment     yes     PRIOR LEVEL OF SWALLOW FUNCTION:    PAST HISTORY OF DYSPHAGIA?: none reported    Home diet: Regular consistency solids (IDDSI level 7) with  thin liquids (IDDSI level 0)  Current Diet Order:  Diet NPO    PROCEDURE:  Consistencies Administered During the Evaluation   Liquids: thin liquid   Solids:  pureed foods and solid foods      Method of Intake:   cup, straw, spoon  Self fed      Position:   Seated, upright    CLINICAL ASSESSMENT:  Oral Stage: The oral stage of swallowing was within functional limits for consistencies administered      Pharyngeal Stage:    No signs of aspiration were noted during this evaluation however, silent aspiration cannot be ruled out at bedside. If silent aspiration is suspected, a Videofluoroscopic Study of Swallowing (MBS) is recommended and requires a physician order. Cognition:   Within functional limits for this exam    Oral Peripheral Examination   Adequate lingual/labial strength     Current Respiratory Status    room air     Parameters of Speech Production  Respiration:  Adequate for speech production  Quality:   Within functional limits  Intensity: Within functional limits    Volitional Swallow: present     Volitional Cough:   present     Pain: No pain reported. EDUCATION:   The Speech Language Pathologist (SLP) completed education regarding results of evaluation and that intervention is not warranted at this time. Learner: Patient  Education: Reviewed results and recommendations of this evaluation  Evaluation of Education:  Meredeth Essex understanding    This plan may be re-evaluated and revised as warranted.       Evaluation Time includes thorough review of current medical information, gathering information on past medical history/social history and prior level of function, completion of standardized testing/informal observation of tasks, assessment of data and education on plan of care and goals. [x]The admitting diagnosis and active problem list, have been reviewed prior to initiation of this evaluation. ACTIVE PROBLEM LIST:   Patient Active Problem List   Diagnosis    Hypertension    Hyperlipidemia    DEMARCUS (obstructive sleep apnea)    Mixed sleep apnea    Stroke-like symptoms         CPT code:  00874  bedside swallow eval    Lizz GLASGOW CCC/SLP [id number]  Speech-Language Pathologist

## 2023-03-11 NOTE — ED NOTES
Nurse to nurse called to [facility].       Marguerite Gimenez RN  03/11/23 [id number]

## 2023-03-12 ENCOUNTER — APPOINTMENT (OUTPATIENT)
Dept: CT IMAGING | Age: 68
DRG: 069 | End: 2023-03-12
Payer: MEDICARE

## 2023-03-12 VITALS
RESPIRATION RATE: 18 BRPM | OXYGEN SATURATION: 96 % | SYSTOLIC BLOOD PRESSURE: 127 MMHG | WEIGHT: 190 LBS | BODY MASS INDEX: 25.73 KG/M2 | HEIGHT: 72 IN | DIASTOLIC BLOOD PRESSURE: 84 MMHG | TEMPERATURE: 97.7 F | HEART RATE: 72 BPM

## 2023-03-12 LAB
ANION GAP SERPL CALCULATED.3IONS-SCNC: 10 MMOL/L (ref 7–16)
BASOPHILS ABSOLUTE: 0.07 E9/L (ref 0–0.2)
BASOPHILS RELATIVE PERCENT: 1 % (ref 0–2)
BUN BLDV-MCNC: 17 MG/DL (ref 6–23)
CALCIUM SERPL-MCNC: 10.4 MG/DL (ref 8.6–10.2)
CHLORIDE BLD-SCNC: 101 MMOL/L (ref 98–107)
CHOLESTEROL, TOTAL: 166 MG/DL (ref 0–199)
CO2: 28 MMOL/L (ref 22–29)
CREAT SERPL-MCNC: 1.1 MG/DL (ref 0.7–1.2)
EOSINOPHILS ABSOLUTE: 0.29 E9/L (ref 0.05–0.5)
EOSINOPHILS RELATIVE PERCENT: 4.2 % (ref 0–6)
GFR SERPL CREATININE-BSD FRML MDRD: >60 ML/MIN/1.73
GLUCOSE BLD-MCNC: 96 MG/DL (ref 74–99)
HBA1C MFR BLD: 5.5 % (ref 4–5.6)
HCT VFR BLD CALC: 43.9 % (ref 37–54)
HDLC SERPL-MCNC: 35 MG/DL
HEMOGLOBIN: 14.6 G/DL (ref 12.5–16.5)
IMMATURE GRANULOCYTES #: 0.01 E9/L
IMMATURE GRANULOCYTES %: 0.1 % (ref 0–5)
LDL CHOLESTEROL CALCULATED: 94 MG/DL (ref 0–99)
LV EF: 63 %
LVEF MODALITY: NORMAL
LYMPHOCYTES ABSOLUTE: 1.72 E9/L (ref 1.5–4)
LYMPHOCYTES RELATIVE PERCENT: 24.6 % (ref 20–42)
MAGNESIUM: 2.1 MG/DL (ref 1.6–2.6)
MCH RBC QN AUTO: 30.6 PG (ref 26–35)
MCHC RBC AUTO-ENTMCNC: 33.3 % (ref 32–34.5)
MCV RBC AUTO: 92 FL (ref 80–99.9)
MONOCYTES ABSOLUTE: 0.66 E9/L (ref 0.1–0.95)
MONOCYTES RELATIVE PERCENT: 9.5 % (ref 2–12)
NEUTROPHILS ABSOLUTE: 4.23 E9/L (ref 1.8–7.3)
NEUTROPHILS RELATIVE PERCENT: 60.6 % (ref 43–80)
PDW BLD-RTO: 12.9 FL (ref 11.5–15)
PHOSPHORUS: 3.2 MG/DL (ref 2.5–4.5)
PLATELET # BLD: 250 E9/L (ref 130–450)
PMV BLD AUTO: 9.2 FL (ref 7–12)
POTASSIUM SERPL-SCNC: 4.2 MMOL/L (ref 3.5–5)
RBC # BLD: 4.77 E12/L (ref 3.8–5.8)
SODIUM BLD-SCNC: 139 MMOL/L (ref 132–146)
TRIGL SERPL-MCNC: 183 MG/DL (ref 0–149)
VLDLC SERPL CALC-MCNC: 37 MG/DL
WBC # BLD: 7 E9/L (ref 4.5–11.5)

## 2023-03-12 PROCEDURE — G0378 HOSPITAL OBSERVATION PER HR: HCPCS

## 2023-03-12 PROCEDURE — 93306 TTE W/DOPPLER COMPLETE: CPT

## 2023-03-12 PROCEDURE — 97161 PT EVAL LOW COMPLEX 20 MIN: CPT

## 2023-03-12 PROCEDURE — 99232 SBSQ HOSP IP/OBS MODERATE 35: CPT

## 2023-03-12 PROCEDURE — 70498 CT ANGIOGRAPHY NECK: CPT

## 2023-03-12 PROCEDURE — 6370000000 HC RX 637 (ALT 250 FOR IP): Performed by: FAMILY MEDICINE

## 2023-03-12 PROCEDURE — 6370000000 HC RX 637 (ALT 250 FOR IP): Performed by: STUDENT IN AN ORGANIZED HEALTH CARE EDUCATION/TRAINING PROGRAM

## 2023-03-12 PROCEDURE — 70496 CT ANGIOGRAPHY HEAD: CPT

## 2023-03-12 PROCEDURE — 83036 HEMOGLOBIN GLYCOSYLATED A1C: CPT

## 2023-03-12 PROCEDURE — 97165 OT EVAL LOW COMPLEX 30 MIN: CPT

## 2023-03-12 PROCEDURE — 6360000004 HC RX CONTRAST MEDICATION: Performed by: RADIOLOGY

## 2023-03-12 PROCEDURE — 85025 COMPLETE CBC W/AUTO DIFF WBC: CPT

## 2023-03-12 PROCEDURE — 83735 ASSAY OF MAGNESIUM: CPT

## 2023-03-12 PROCEDURE — 36415 COLL VENOUS BLD VENIPUNCTURE: CPT

## 2023-03-12 PROCEDURE — 84100 ASSAY OF PHOSPHORUS: CPT

## 2023-03-12 PROCEDURE — 80061 LIPID PANEL: CPT

## 2023-03-12 PROCEDURE — 80048 BASIC METABOLIC PNL TOTAL CA: CPT

## 2023-03-12 PROCEDURE — 6370000000 HC RX 637 (ALT 250 FOR IP): Performed by: HOSPITALIST

## 2023-03-12 RX ORDER — ASPIRIN 81 MG/1
81 TABLET ORAL DAILY
Qty: 30 TABLET | Refills: 3 | Status: SHIPPED | OUTPATIENT
Start: 2023-03-13

## 2023-03-12 RX ADMIN — RIVAROXABAN 20 MG: 20 TABLET, FILM COATED ORAL at 09:50

## 2023-03-12 RX ADMIN — HYDROCHLOROTHIAZIDE 12.5 MG: 12.5 TABLET ORAL at 09:49

## 2023-03-12 RX ADMIN — IOPAMIDOL 75 ML: 755 INJECTION, SOLUTION INTRAVENOUS at 08:01

## 2023-03-12 RX ADMIN — MELATONIN 3 MG ORAL TABLET 3 MG: 3 TABLET ORAL at 00:32

## 2023-03-12 RX ADMIN — ESCITALOPRAM OXALATE 10 MG: 10 TABLET ORAL at 09:49

## 2023-03-12 RX ADMIN — VALSARTAN 320 MG: 320 TABLET ORAL at 09:49

## 2023-03-12 RX ADMIN — AMLODIPINE BESYLATE 5 MG: 5 TABLET ORAL at 09:52

## 2023-03-12 RX ADMIN — ASPIRIN 81 MG: 81 TABLET, COATED ORAL at 09:49

## 2023-03-12 NOTE — PROGRESS NOTES
Attempted to call echo to see if they are here and will be able to complete echo today.  Will await callback

## 2023-03-12 NOTE — PROGRESS NOTES
Trae Whitfield is a 67 y.o. right handed male     Neurology following for numbness and aphasia    PMH of hypertension, hyperlipidemia and obstructive sleep apnea    Assessment:     TIAs  Episodes of expressive aphasia and right face and leg numbness both localizing to the left hemisphere  Episodes lasted a few minutes at a time  Episode of right face and leg numbness which lasted several hours then completely resolving  Stroke risk factors include hypertension, hyperlipidemia and obstructive sleep apnea  Patient takes Xarelto at home, will add aspirin    Plan:     Continue aspirin 81 mg daily and Xarelto  Stroke education  Continue injectable lipid medication, consider adjustment of the medication LDL was 94  LDL goal <70  Neurology will sign off call if needed  Follow-up with neurology after discharge    Subjective:     Patient presented to the ER in 3/11/2023 after experiencing a few episodes of aphasia and right face and leg numbness.  Patient was playing golf when he experienced vertigo after turning his head.  Later that day he had trouble with his speech for a few minutes.  He said his speech was effortful.  The next morning he woke up with right face and leg numbness which lasted approximately until 1 or 2 PM.  The symptoms completely resolved but he wanted to be evaluated in the emergency room.    He has history of a DVT and a PE in January 2022 and takes Xarelto at home.  He does not take aspirin.    Patient sitting up in his bed on the telephone.  He is awake, alert, and oriented x4.  He just returned from his echocardiogram and the read is pending.  He is no longer experiencing any facial/leg numbness or aphasia.  He is anxious to go home.  I did review his LDL with him and he is to discuss this with his primary care physician when he is discharged home.  All questions answered    No chest pain or palpitations  No coughing or wheezing    No vertigo, lightheadedness or loss of consciousness  No falls,  tripping or stumbling  No incontinence of bowels or bladder  No itching or bruising appreciated  No numbness, tingling or focal arm/leg weakness    ROS otherwise negative      Objective:       /84   Pulse 72   Temp 97.7 °F (36.5 °C) (Temporal)   Resp 18   Ht 6' (1.829 m)   Wt 190 lb (86.2 kg)   SpO2 96%   BMI 25.77 kg/m²       General appearance: alert, appears stated age, cooperative and no distress  Head: normocephalic, without obvious abnormality, atraumatic  Eyes: conjunctivae/corneas clear  Neck: no adenopathy,  supple, symmetrical, trachea midline and thyroid not enlarged, symmetric, no tenderness/mass/nodules  Lungs regular respirations on room air:  Heart: No chest pain or palpitations  Abdomen: soft, non-tender; bowel sounds normal; no masses,  no organomegaly  Extremities:  normal, atraumatic, no cyanosis or edema  Pulses: 2+ and symmetric  Skin: color, texture, turgor normal---no rashes or lesions      Mental Status: Alert, oriented, thought content appropriate, alertness: alert, orientation: time, date, person, place, city, affect: normal     Appropriate attention/concentration  Intact fundus of knowledge  Repetition intact  Intact memories      Speech: Clear  Language: No aphasias    Cranial Nerves:  I: smell NA   II: visual acuity  NA   II: visual fields Full to confrontation   II: pupils CATRACHITO   III,VII: ptosis None   III,IV,VI: extraocular muscles  Full ROM   V: mastication    V: facial light touch sensation  Normal   V,VII: corneal reflex     VII: facial muscle function - upper  Normal   VII: facial muscle function - lower Normal   VIII: hearing Normal   IX: soft palate elevation     IX,X: gag reflex    XI: trapezius strength  5/5   XI: sternocleidomastoid strength 5/5   XI: neck extension strength  5/5   XII: tongue strength  Normal     Motor:  5/5 throughout  Normal bulk and tone  No drift   No abnormal movements    Sensory:  LT and PP normal  Vibration normal    Coordination:   FN, FFM and ENZO normal  HS normal    Gait:  Normal    DTR:   2+ throughout  No Babinskis  No Fay's    No other pathological reflexes    Laboratory/Radiology:     CBC with Differential:    Lab Results   Component Value Date/Time    WBC 7.0 03/12/2023 08:17 AM    RBC 4.77 03/12/2023 08:17 AM    HGB 14.6 03/12/2023 08:17 AM    HCT 43.9 03/12/2023 08:17 AM     03/12/2023 08:17 AM    MCV 92.0 03/12/2023 08:17 AM    MCH 30.6 03/12/2023 08:17 AM    MCHC 33.3 03/12/2023 08:17 AM    RDW 12.9 03/12/2023 08:17 AM    LYMPHOPCT 24.6 03/12/2023 08:17 AM    MONOPCT 9.5 03/12/2023 08:17 AM    BASOPCT 1.0 03/12/2023 08:17 AM    MONOSABS 0.66 03/12/2023 08:17 AM    LYMPHSABS 1.72 03/12/2023 08:17 AM    EOSABS 0.29 03/12/2023 08:17 AM    BASOSABS 0.07 03/12/2023 08:17 AM     CMP:    Lab Results   Component Value Date/Time     03/12/2023 09:02 AM    K 4.2 03/12/2023 09:02 AM     03/12/2023 09:02 AM    CO2 28 03/12/2023 09:02 AM    BUN 17 03/12/2023 09:02 AM    CREATININE 1.1 03/12/2023 09:02 AM    LABGLOM >60 03/12/2023 09:02 AM    GLUCOSE 96 03/12/2023 09:02 AM    PROT 7.5 03/11/2023 03:16 AM    LABALBU 4.4 03/11/2023 03:16 AM    CALCIUM 10.4 03/12/2023 09:02 AM    BILITOT 0.4 03/11/2023 03:16 AM    ALKPHOS 66 03/11/2023 03:16 AM    AST 28 03/11/2023 03:16 AM    ALT 25 03/11/2023 03:16 AM     HgBA1c:    Lab Results   Component Value Date/Time    LABA1C 5.5 03/12/2023 09:02 AM     FLP:    Lab Results   Component Value Date/Time    TRIG 183 03/12/2023 09:02 AM    HDL 35 03/12/2023 09:02 AM    LDLCALC 94 03/12/2023 09:02 AM    LABVLDL 37 03/12/2023 09:02 AM     CT head  Negative for acute abnormalities    CTA head/CTA neck  Negative for acute abnormalities    MRI brain  Negative for acute abnormalities    Echocardiogram  Completed, read is pending    All labs and imaging studies reviewed independently today         ROSARIO Flanagan CNP  12:46 PM  3/12/2023

## 2023-03-12 NOTE — PROGRESS NOTES
[address]    SDFX:                                                  Patient Name: Dean Moralez    MRN: [id number]    : 1955    Room: [address] [place] [address]      Evaluating OT: MOR Steele, [id number]    Referring Christy Robert, DO  Specific Provider Orders/Date: OT Eval and Treat 3/11/23    Diagnosis: Stroke-like symptoms [R29.90]   Surgery: NA     Pertinent Medical History:      Past Medical History:   Diagnosis Date    Hyperlipidemia     Hypertension          Past Surgical History:   Procedure Laterality Date    KNEE SURGERY      NOSE SURGERY      TRANSESOPHAGEAL ECHOCARDIOGRAM  2014    ROSHAN Dr Mayela Galvan     Precautions:  None    Assessment of current deficits    [] Functional mobility  []ADLs  [] Strength               []Cognition    [] Functional transfers   [] IADLs         [] Safety Awareness   []Endurance    [] Fine Coordination              [] Balance      [] Vision/perception   []Sensation     []Gross Motor Coordination  [] ROM  [] Delirium                   [] Motor Control     OT PLAN OF CARE   Patient has no OT needs at this time. Will be discharged from OT caseload.  Please reorder if there is a change in functional status    Modified Willy Scale (MRS)  Score     Description  0             No symptoms  1             No significant disability despite symptoms  2             Slight disability; able to look after own affairs  3             Moderate disability; able to ambulate without assist/ requires assist with ADLs  4             Moderate/Severe disability;requires assist to ambulate/assist with ADLs  5             Severe disability;bedridden/incontinent   6               Score:   0    Recommended Adaptive Equipment: none    Home Living: Pt lives with wife in a 2 story home with no step(s) to enter and no rail(s); bed/bath on second floor with half bath on first floor  Bathroom setup: tub shower no SB, standard commode  Equipment owned: none    Prior Level of Function: independent with ADLs and with IADLs; using no device for functional mobility. Driving: yes  Occupation: [employer], enjoys [employer]    Pain Level: denies pain  Cognition: A&O: 4/4; Follows 3 step directions   Memory: G   Sequencing: G   Problem solving: G   Judgement/safety: G     Functional Assessment: AM-PAC Daily Activity Raw Score: 24/24   Initial Eval Status  Date: 3/12/23     Feeding Independent      Grooming Independent      UB Dressing Independent      LB Dressing Independent      Bathing Independent      Toileting Independent      Bed Mobility  Rolling: Independent   Supine to sit: Independent   Sit to supine: Independent      Functional Transfers Sit to stand: Independent   Stand to sit: Independent      Functional Mobility Independent      Balance Sitting:     Static:  Independent     Dynamic:Independent   Standing: Independent      Endurance/Activity Tolerance good     Visual/  Perceptual Glasses: yes, worn in room   -wfl basic visual screening completed           Hand Dominance R   AROM (PROM) Strength Additional Info:    RUE  WFL 5/5 good  and wfl FMC/dexterity noted during ADL tasks   LUE WFL 5/5 good  and wfl FMC/dexterity noted during ADL tasks     Hearing: wfl  Sensation:  No c/o numbness or tingling  Tone: WNL  Edema: unremarkable                            Comments:  Upon arrival patient supine with HOB slightly elevated. After session, patient seated EOB with all devices within reach, all lines and tubes intact. Overall, patient demonstrates no difficulties with completion of BADLs and IADLs. As noted above, patient has no further occupational therapy needs at this time and will be removed from OT caseload.     Eval Complexity:   Low  Complexity    Treatment: Evaluation only     Eval Complexity: Low      Time In:1019  Time Out: 1027  Total Time:8 minutes    Min Units   OT Eval Low 97165 X 1    OT Eval Medium 07553      OT Eval High [id number]       OT Re-Eval [id number]       Therapeutic Ex [id number]       Therapeutic Activities 25597       ADL/Self Care 41674      Orthotic Management 14993       Neuro Re-Ed 63119       Non-Billable Time          Evaluation Time additionally includes thorough review of current medical information, gathering information on past medical history/social history and prior level of function, completion of standardized testing/informal observation of tasks, assessment of data and education on plan of care and goals.     Mily Perez, OTR/L  [id number]

## 2023-03-12 NOTE — PROGRESS NOTES
Physical Therapy    Physical Therapy Initial Assessment     Name: Trae Whitfield  : 1955  MRN: [id number]      Date of Service: 3/12/2023    Evaluating PT:  Fausto Bermudez, PT, DPT  [id number]     Room #:  8504/8504-A  Diagnosis:  Stroke-like symptoms [R29.90]  PMHx/PSHx:   has a past medical history of Hyperlipidemia and Hypertension.   Procedure/Surgery:  none   Precautions:  Low fall risk  Equipment Needs:  none     SUBJECTIVE:    Pt lives with his wife in a 2 story home with 0 stairs with 0 rail to enter.  Bedroom and bathroom are on the 2nd level with full flight and rail.  Pt ambulated with no AD, independent, drives PTA.    OBJECTIVE:   Initial Evaluation  Date: 3/12/23   AM-PAC 6 Clicks    Was pt agreeable to Eval/treatment? Yes    Does pt have pain? none   Bed Mobility  Rolling: Independent   Supine to sit: Independent   Sit to supine: Independent   Scooting: Independent    Transfers Sit to stand: Independent   Stand to sit: Independent   Stand pivot: Independent    Ambulation    200 feet with no AD Independent    Stair negotiation: ascended and descended  4 steps with 1 rail Modified Independent     ROM BUE:  Per OT eval  BLE:  WFL   Strength BUE:  Per OT eval   BLE:  5/5   Balance Sitting EOB:  Independent   Dynamic Standing:  Independent      Pt is A & O x 4  Sensation:  Pt denies numbness and tingling to extremities  Edema:  Unremarkable  Coordination:  no deficits noted with heel to shin or ENZO (toe tapping)    Therapeutic Exercises:    BLE AROM    Patient education  Pt educated on PT role    Patient response to education:   Pt verbalized understanding Pt demonstrated skill Pt requires further education in this area   Yes  Yes  No      ASSESSMENT:    Conditions Requiring Skilled Therapeutic Intervention: None       Comments:  Pt received supine and agreeable to PT evaluation.  Vitals monitored during session.  Pt demonstrates independence with functional mobility.  No deficits noted with  bedside testing of UE strength, LE strength, UE coordination, or LE coordination. Pt completed ambulation in hallway and negotiated stairs without loss of balance or incident. No skilled PT needs identified at this time. Pt left back in room with call button in reach, lines attached, and needs met. Treatment:  Patient practiced and was instructed in the following treatment:    Eval only     Pt's/ family goals   1. Home     Prognosis is excellent for reaching above PT goals. Patient and or family understand(s) diagnosis, prognosis, and plan of care. Yes     PHYSICAL THERAPY PLAN OF CARE:    PT POC is established based on physician order and patient diagnosis     Referring provider/PT Order:        Nestor Leblanc, DO    / PT eval and treat   Diagnosis:  Stroke-like symptoms [R29.90]  Specific instructions for next treatment:  None, d/c PT services. Please re-consult if needed. Time in  1015  Time out  1025    Total Treatment Time  0 minutes     Evaluation Time includes thorough review of current medical information, gathering information on past medical history/social history and prior level of function, completion of standardized testing/informal observation of tasks, assessment of data and education on plan of care and goals.     CPT codes:  [x] Low Complexity PT evaluation 26544  [] Moderate Complexity PT evaluation 83305  [] High Complexity PT evaluation 98550  [] PT Re-evaluation 70698  [] Gait training 29430 -- minutes  [] Manual therapy [id number] -- minutes  [] Therapeutic activities 19827 - minutes  [] Therapeutic exercises 18588 - minutes  [] Neuromuscular reeducation 84966 -- minutes     Sarah Beth Johnson PT, DPT  [id number]

## 2023-03-12 NOTE — PLAN OF CARE
Problem: Discharge Planning  Goal: Discharge to home or other facility with appropriate resources  Outcome: Progressing  Flowsheets  Taken 3/11/2023 1747 by Troy Ramos RN  Discharge to home or other facility with appropriate resources: Identify barriers to discharge with patient and caregiver  Taken 3/11/2023 1720 by Troy Ramos RN  Discharge to home or other facility with appropriate resources: Identify barriers to discharge with patient and caregiver     Problem: ABCDS Injury Assessment  Goal: Absence of physical injury  Outcome: Progressing

## 2023-03-12 NOTE — CONSULTS
[facility]  Neurology Consult    Date:  3/11/2023  Patient Name:  Lo Rashid  YOB: 1955  MRN: [id number]     PCP:  Ramila Almeida MD   Referring:  No ref. provider found      Chief Complaint: numbness and trouble talking    History obtained from: patient    Assessment  Lo Rashid is a 79 y.o. male patient admitted with an episode of suspected expressive aphasia and right face/leg numbness both localizing to the the left hemisphere for possible TIAs. Plan  Continue ASA 81 in addition to home Xarelto  CTA head and neck  Echo likely as outpatient  Has statin intolerance, takes injectable lipid medication at home  Repeat lipid panel, Hgb A1c        History of Present Illness:  Lo Rashid is a 79 y.o. right handed male presenting for evaluation of possible stroke. Miniaturized and dictate I been dictating the last couple days the patient states he was playing golf and he golfs area when he turned his head around suddenly and had a moment of vertigo. He later on felt that his speech was effortful for just a few minutes. This also resolved spontaneously. The next morning when he awoke he noticed his right face and leg were numb until approximately 1 to 2 PM.    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 He takes Xarelto for this. He is not on aspirin. He has no history of stroke or heart attack. He does have a history of hypertension and hyperlipidemia. No history of diabetes mellitus or smoking. No family history of stroke. He currently feels as if he is at his baseline.     \  Medical History:   Past Medical History:   Diagnosis Date    Hyperlipidemia     Hypertension         Surgical History:   Past Surgical History:   Procedure Laterality Date    KNEE SURGERY      NOSE SURGERY      TRANSESOPHAGEAL ECHOCARDIOGRAM  03/20/2014    ROSHAN Dr Tess Dash        Family History:   As per HPI    Social History:  Social History Tobacco Use    Smoking status: Never   Substance Use Topics    Alcohol use: No    Drug use: No        Current Medications:      Current Facility-Administered Medications   Medication Dose Route Frequency Provider Last Rate Last Admin    amLODIPine (NORVASC) tablet 5 mg  5 mg Oral Daily Elane Loosen, DO   5 mg at 03/11/23 1006    valsartan (DIOVAN) tablet 320 mg  320 mg Oral Daily Elane Loosen, DO        ondansetron (ZOFRAN-ODT) disintegrating tablet 4 mg  4 mg Oral Q8H PRN Elane Loosen, DO        Or    ondansetron [facility]) injection 4 mg  4 mg IntraVENous Q6H PRN Elane Loosen, DO        polyethylene glycol (GLYCOLAX) packet 17 g  17 g Oral Daily PRN Elane Loosen, DO        aspirin EC tablet 81 mg  81 mg Oral Daily Elane Loosen, DO   81 mg at 03/11/23 1007    Or    aspirin suppository 300 mg  300 mg Rectal Daily Elane Loosen, DO        [Held by provider] atorvastatin (LIPITOR) tablet 80 mg  80 mg Oral Nightly Elane Loosen, DO        escitalopram (LEXAPRO) tablet 10 mg  10 mg Oral Daily Misha Gilman MD   10 mg at 03/11/23 1406    hydroCHLOROthiazide (HYDRODIURIL) tablet 12.5 mg  12.5 mg Oral Daily Misha Gilman MD   12.5 mg at 03/11/23 1406    rivaroxaban (XARELTO) tablet 20 mg  20 mg Oral Daily Misha Gilman MD        perflutren lipid microspheres (DEFINITY) injection 1.5 mL  1.5 mL IntraVENous ONCE PRN [place],             Allergies:      No Known Allergies     Physical Examination  Vitals   Vitals:    03/11/23 1406 03/11/23 1500 03/11/23 1700 03/11/23 1730   BP: (!) 140/89 (!) 142/93 (!) 152/99 (!) 152/99   Pulse: 80 78 74 74   Resp: 16 16 18    Temp:   97.2 °F (36.2 °C)    TempSrc:   Temporal    SpO2: 99% 99% 93%    Weight:       Height:            General: Patient appears in no acute distress. HEENT: Normocephalic, atraumatic  Chest: no dyspnea  Heart: RRR  Extremities/Peripheral vascular: No edema/swelling noted. No cold limbs noted.     Neurologic Examination    Mental Status  Alert, and oriented to person, place and time. Speech is fluent with intact comprehension. No evidence of memory impairment. Attention and concentration appeared normal.     Cranial Nerves  II. Visual fields full to confrontation bilaterally. Fundoscopic exam: Discs not well visualized. III, IV, VI: Pupils equally round and reactive to light, 4 to 3 mm bilaterally. EOMs: full, no nystagmus. V. Facial sensation intact to light touch bilaterally  VII: Facial movements symmetric and strong  VIII: Hearing intact to voice  IX,X: Palate elevates symmetrically. No dysarthria  XI: Sternocleidomastoid and trapezius 5/5 bilaterally   XII: Tongue is midline    Motor     Right Left   Right Left   Deltoid 5 5  Hip Flexion 5 5   Biceps 5 5  Knee Extension 5 5   Triceps 5 5  Knee Flexion 5 5   Handgrip 5 5  Ankle Dorsiflexion 5 5       Ankle Plantarflexion 5 5       Pronator drift: absent bilaterally    Sensation  Light Touch: Intact distally in all four limbs    Reflexes     Right Left   Biceps 2 2   Brachioradialis 2 2   Patellar 2 2   Achilles 2 2   ankle clonus none none   Babinski absent absent     Coordination  Rapid alternating movements normal in bilateral upper extremities  Finger to nose testing normal bilaterally  Heel to shin testing normal bilaterally    Gait  Normal base, stride, and arm swing with casual gait. 2-3 steps to turn. Normal heel, toe walking  Romberg test negative        Labs  Recent Labs     03/11/23  0316      K 3.8      CO2 26   BUN 18   CREATININE 1.0   GLUCOSE 120*   CALCIUM 11.0*   PROT 7.5   LABALBU 4.4   BILITOT 0.4   ALKPHOS 66   AST 28   ALT 25   WBC 7.0   RBC 4.73   HGB 14.6   HCT 42.6   MCV 90.1   MCH 30.9   MCHC 34.3   RDW 12.8      MPV 9.0       Imaging  MRI brain without contrast   Final Result   No acute ischemia. Mild chronic microvascular disease within the periventricular and subcortical   white matter.          CT HEAD WO CONTRAST   Final Result   No acute intracranial abnormality. RECOMMENDATIONS:   Careful clinical correlation and follow up recommended.          CTA HEAD W CONTRAST    (Results Pending)   CTA NECK W CONTRAST    (Results Pending)             Electronically signed by Lanie Child DO on 3/11/2023 at 7:35 PM

## 2023-03-12 NOTE — DISCHARGE INSTRUCTIONS
Please follow up with your PCP within one week of discharge. Please return to the ER if your symptoms persist or worsen.

## 2023-03-12 NOTE — PROGRESS NOTES
Internal Medicine Discharge Summary    Patient ID: Lo Rashid      Patient's PCP: Ramila Almeida MD    Admit Date: 3/11/2023     Discharge Date:   03/12/2023    Admitting Physician: Jeffy Barron DO     Discharge Physician: Misha Gilman MD     Discharge Diagnoses:  Suspected stroke versus TIA  Hypertension  Hyperlipidemia  History of previous DVT and PE        The patient was seen and examined on day of discharge and this discharge summary is in conjunction with any daily progress note from day of discharge. Hospital Course:   79year old male with a PMH significant for HTN, HLD, DVT and PE on xarelto presented to the ER 2/2 expressive aphasia and numbness of the right side of his face as well as right upper and lower extremity. Patient's symptoms started with a possible episode of vertigo while he was playing golf, which resolved within moments. Patient then states that his speech was effortful for a few minutes, but resolved promptly after. The next morning patient woke up with numbness of the face as well as the right upper and lower extremity. 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 He takes Xarelto for this. He is not on aspirin. He has no history of stroke or heart attack. Patient denies having history of DM or smoking, and no family history of stroke. CT head was found to be negative. MRI does not show any acute ischemia, only mild chronic microvascular disease within the periventricular and subcortical white matter. CTA head and neck was also found to be negative. A1c was found to be 5.5 and lipid panel was found to show mild elevation in TGLs. Patient was monitored with neuro checks. Patient was optimized medically. As patient cannot tolerate Lipitor, patient was advised to continue praluent on discharge. Patient to also continue taking his xarelto. Patient's home medications were continued for his history of hypertension.  Patient is currently AAO x 3 and is in full capacity to make his decision. Patient is currently stable for discharge. Exam:     /84   Pulse 72   Temp 97.7 °F (36.5 °C) (Temporal)   Resp 18   Ht 6' (1.829 m)   Wt 190 lb (86.2 kg)   SpO2 96%   BMI 25.77 kg/m²     General appearance: No apparent distress, appears stated age and cooperative. HEENT: Pupils equal, round, and reactive to light. Conjunctivae/corneas clear. Neck: Supple, with full range of motion. No jugular venous distention. Trachea midline. Respiratory:  Normal respiratory effort. Clear to auscultation, bilaterally without Rales/Wheezes/Rhonchi. Cardiovascular: Regular rate and rhythm with normal S1/S2 without murmurs, rubs or gallops. Abdomen: Soft, non-tender, non-distended with normal bowel sounds. Musculoskeletal: No clubbing, cyanosis or edema bilaterally. Full range of motion without deformity. Skin: Skin color, texture, turgor normal.  No rashes or lesions. Neurologic:  Neurovascularly intact without any focal sensory/motor deficits. Cranial nerves: II-XII intact, grossly non-focal.  Psychiatric: Alert and oriented, thought content appropriate, normal insight      Consults:     IP CONSULT TO NEUROLOGY    Disposition:  Home    Discharge Instructions/Follow-up:    Please follow up with your PCP within one week of discharge. Please return to the ER if your symptoms persist or worsen. Code Status:  Full Code     Activity: activity as tolerated    Diet: cardiac diet    Labs:  For convenience and continuity at follow-up the following most recent labs are provided:      CBC:    Lab Results   Component Value Date/Time    WBC 7.0 03/12/2023 08:17 AM    HGB 14.6 03/12/2023 08:17 AM    HCT 43.9 03/12/2023 08:17 AM     03/12/2023 08:17 AM       Renal:    Lab Results   Component Value Date/Time     03/12/2023 09:02 AM    K 4.2 03/12/2023 09:02 AM     03/12/2023 09:02 AM    CO2 28 03/12/2023 09:02 AM    BUN 17 03/12/2023 09:02 AM CREATININE 1.1 03/12/2023 09:02 AM    CALCIUM 10.4 03/12/2023 09:02 AM    PHOS 3.2 03/12/2023 09:02 AM       Discharge Medications:     Current Discharge Medication List             Details   aspirin 81 MG EC tablet Take 1 tablet by mouth daily  Qty: 30 tablet, Refills: 3                Details   amLODIPine (NORVASC) 10 MG tablet Take 10 mg by mouth daily      valsartan (DIOVAN) 320 MG tablet Take 320 mg by mouth daily      alirocumab (PRALUENT) 75 MG/ML SOAJ injection pen Inject 75 mg into the skin every 14 days      escitalopram (LEXAPRO) 10 MG tablet Take 10 mg by mouth daily      hydroCHLOROthiazide (HYDRODIURIL) 12.5 MG tablet Take 12.5 mg by mouth daily      rivaroxaban (XARELTO) 20 MG TABS tablet Take 20 mg by mouth daily      zolpidem (AMBIEN) 5 MG tablet Take 5 mg by mouth nightly.  Refills: 0      Multiple Vitamins-Minerals (THERAPEUTIC MULTIVITAMIN-MINERALS) tablet Take 1 tablet by mouth daily             Time Spent on discharge is more than 30 minutes in the examination, evaluation, counseling and review of medications and discharge plan.      Signed:    Andreia Toro MD   3/12/2023

## 2023-03-13 LAB
EKG ATRIAL RATE: 67 BPM
EKG P AXIS: 27 DEGREES
EKG P-R INTERVAL: 198 MS
EKG Q-T INTERVAL: 412 MS
EKG QRS DURATION: 90 MS
EKG QTC CALCULATION (BAZETT): 435 MS
EKG R AXIS: -12 DEGREES
EKG T AXIS: 8 DEGREES
EKG VENTRICULAR RATE: 67 BPM

## 2023-03-13 PROCEDURE — 93010 ELECTROCARDIOGRAM REPORT: CPT | Performed by: INTERNAL MEDICINE

## 2023-04-03 ENCOUNTER — OFFICE VISIT (OUTPATIENT)
Dept: SLEEP CENTER | Age: 68
End: 2023-04-03
Payer: MEDICARE

## 2023-04-03 VITALS
RESPIRATION RATE: 16 BRPM | BODY MASS INDEX: 26.6 KG/M2 | DIASTOLIC BLOOD PRESSURE: 89 MMHG | HEART RATE: 73 BPM | HEIGHT: 72 IN | OXYGEN SATURATION: 99 % | SYSTOLIC BLOOD PRESSURE: 129 MMHG | WEIGHT: 196.4 LBS

## 2023-04-03 DIAGNOSIS — G47.39 MIXED SLEEP APNEA: Primary | ICD-10-CM

## 2023-04-03 DIAGNOSIS — G47.00 INSOMNIA, UNSPECIFIED TYPE: ICD-10-CM

## 2023-04-03 PROCEDURE — 3074F SYST BP LT 130 MM HG: CPT | Performed by: NURSE PRACTITIONER

## 2023-04-03 PROCEDURE — 3079F DIAST BP 80-89 MM HG: CPT | Performed by: NURSE PRACTITIONER

## 2023-04-03 PROCEDURE — 99203 OFFICE O/P NEW LOW 30 MIN: CPT | Performed by: NURSE PRACTITIONER

## 2023-04-03 ASSESSMENT — SLEEP AND FATIGUE QUESTIONNAIRES
HOW LIKELY ARE YOU TO NOD OFF OR FALL ASLEEP WHILE WATCHING TV: 1
HOW LIKELY ARE YOU TO NOD OFF OR FALL ASLEEP WHILE LYING DOWN TO REST IN THE AFTERNOON WHEN CIRCUMSTANCES PERMIT: 3
HOW LIKELY ARE YOU TO NOD OFF OR FALL ASLEEP WHILE SITTING INACTIVE IN A PUBLIC PLACE: 0
ESS TOTAL SCORE: 6
HOW LIKELY ARE YOU TO NOD OFF OR FALL ASLEEP IN A CAR, WHILE STOPPED FOR A FEW MINUTES IN TRAFFIC: 0
HOW LIKELY ARE YOU TO NOD OFF OR FALL ASLEEP WHILE SITTING QUIETLY AFTER LUNCH WITHOUT ALCOHOL: 1
HOW LIKELY ARE YOU TO NOD OFF OR FALL ASLEEP WHILE SITTING AND READING: 1
HOW LIKELY ARE YOU TO NOD OFF OR FALL ASLEEP WHILE SITTING AND TALKING TO SOMEONE: 0
HOW LIKELY ARE YOU TO NOD OFF OR FALL ASLEEP WHEN YOU ARE A PASSENGER IN A CAR FOR AN HOUR WITHOUT A BREAK: 0

## 2023-04-03 NOTE — PROGRESS NOTES
pressure in hopes that patient will be more comfortable at an AUTO versus fixed setting. Advised patient to wear CPAP 30 minutes while awake to adjust to breathing.  -Patient understands that CPAP should be worn every night for the duration of the night     2. Chronic Sleep Initiation/Maintenance Insomnia     -Patient reports years of zolpidem treatment. Over the course of the last 3 to 4 months he has weaned to 5 mg versus 10 mg. May be multifactorial: untreated sleep apnea + stress from work. -Introduced Cognitive Behavioral Therapy for Insomnia (gold standard and first line therapy per AASM for psychophysiologic insomnia) as ideal way to manage insomnia symptoms. Briefly reviewed its core concepts.  -Provided resources, including Say Janis to Insomnia by Dr. Annalisa Thomas and his accompanying website, as well as the [employer] Go! To Sleep. Also provided handout on Dr Maylin Mott program.  -Encouraged continued zolpidem weaning.  -Maintenance insomnia likely to improve with treatment of sleep apnea. Return in about 6 months (around 10/3/2023) for Follow up for sleep apnea.     Tashi Petty, APRN-CNP  [address] [facility]  P -[phone] option 2  - [phone]

## 2023-04-03 NOTE — PATIENT INSTRUCTIONS
Cognitive Behavioral Therapy for Insomnia Resources    Book: Say Janis to Insomnia by Dr. Mini Bailey ($12 on [place])    [facility] To Sleep Module ($40): [email or web address]

## 2023-04-04 ENCOUNTER — TELEPHONE (OUTPATIENT)
Dept: SLEEP CENTER | Age: 68
End: 2023-04-04

## 2023-04-04 NOTE — TELEPHONE ENCOUNTER
Pt notified that [facility] does not carry tap/pap mask and that he would need to try on line and to call the office back if he needed a order

## 2023-08-15 ENCOUNTER — TRANSCRIBE ORDERS (OUTPATIENT)
Dept: ADMINISTRATIVE | Age: 68
End: 2023-08-15

## 2023-08-15 DIAGNOSIS — E05.90 HYPERTHYROIDISM: Primary | ICD-10-CM

## 2023-08-22 ENCOUNTER — HOSPITAL ENCOUNTER (OUTPATIENT)
Dept: NUCLEAR MEDICINE | Age: 68
Discharge: HOME | End: 2023-08-24
Payer: MEDICARE

## 2023-08-22 DIAGNOSIS — E05.90 HYPERTHYROIDISM: ICD-10-CM

## 2023-08-22 PROCEDURE — 78071 PARATHYRD PLANAR W/WO SUBTRJ: CPT

## 2023-08-22 PROCEDURE — 3430000000 HC RX DIAGNOSTIC RADIOPHARMACEUTICAL: Performed by: RADIOLOGY

## 2023-08-22 PROCEDURE — A9500 TC99M SESTAMIBI: HCPCS | Performed by: RADIOLOGY

## 2023-08-22 RX ORDER — TETRAKIS(2-METHOXYISOBUTYLISOCYANIDE)COPPER(I) TETRAFLUOROBORATE 1 MG/ML
27 INJECTION, POWDER, LYOPHILIZED, FOR SOLUTION INTRAVENOUS
Status: COMPLETED | OUTPATIENT
Start: 2023-08-22 | End: 2023-08-22

## 2023-08-22 RX ADMIN — Medication 27 MILLICURIE: at 11:48
